# Patient Record
Sex: FEMALE | Race: WHITE | NOT HISPANIC OR LATINO | Employment: STUDENT | URBAN - METROPOLITAN AREA
[De-identification: names, ages, dates, MRNs, and addresses within clinical notes are randomized per-mention and may not be internally consistent; named-entity substitution may affect disease eponyms.]

---
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
## 2019-07-26 DIAGNOSIS — N94.6 DYSMENORRHEA: ICD-10-CM

## 2019-07-26 RX ORDER — NORETHINDRONE ACETATE AND ETHINYL ESTRADIOL AND FERROUS FUMARATE 1MG-20(24)
KIT ORAL
Qty: 84 TABLET | Refills: 3 | Status: SHIPPED | OUTPATIENT
Start: 2019-07-26 | End: 2020-06-18 | Stop reason: SDUPTHER

## 2019-07-26 NOTE — TELEPHONE ENCOUNTER
Tiara desire refill of OCP, last Ov 8/23/2018, no annual in chart. Dr Gomez pt  Patient Active Problem List   Diagnosis    Generalized abdominal pain    Pharyngitis    Functional diarrhea    Sore throat    Cough    Excessive cerumen in left ear canal    Adjustment disorder with mixed anxiety and depressed mood     Prior to Admission medications    Medication Sig Start Date End Date Taking? Authorizing Provider   norethindrone-e.estradiol-iron (MINASTRIN 24 FE) 1 mg-20 mcg(24) /75 mg (4) Chew Take 1 tablet by mouth once daily. 8/13/18 8/13/19  Matthieu Gomez MD

## 2020-06-18 DIAGNOSIS — N94.6 DYSMENORRHEA: ICD-10-CM

## 2020-06-18 RX ORDER — NORETHINDRONE ACETATE AND ETHINYL ESTRADIOL AND FERROUS FUMARATE 1MG-20(24)
KIT ORAL
Qty: 84 TABLET | Refills: 0 | Status: SHIPPED | OUTPATIENT
Start: 2020-06-18

## 2020-06-18 NOTE — TELEPHONE ENCOUNTER
Tiara desire refill of minastrin 24 fe  LRF:7/19  Annual 8/5  Patient Active Problem List   Diagnosis    Generalized abdominal pain    Pharyngitis    Functional diarrhea    Sore throat    Cough    Excessive cerumen in left ear canal    Adjustment disorder with mixed anxiety and depressed mood     Prior to Admission medications    Medication Sig Start Date End Date Taking? Authorizing Provider   norethindrone-e.estradiol-iron (MINASTRIN 24 FE) 1 mg-20 mcg(24) /75 mg (4) Chew CHEW ONE TABLET BY MOUTH ONCE A DAY 7/26/19   Katrina Cruz CNM